# Patient Record
Sex: FEMALE | Race: WHITE | NOT HISPANIC OR LATINO | Employment: FULL TIME | URBAN - METROPOLITAN AREA
[De-identification: names, ages, dates, MRNs, and addresses within clinical notes are randomized per-mention and may not be internally consistent; named-entity substitution may affect disease eponyms.]

---

## 2017-01-26 ENCOUNTER — GENERIC CONVERSION - ENCOUNTER (OUTPATIENT)
Dept: OTHER | Facility: OTHER | Age: 22
End: 2017-01-26

## 2017-04-13 ENCOUNTER — GENERIC CONVERSION - ENCOUNTER (OUTPATIENT)
Dept: OTHER | Facility: OTHER | Age: 22
End: 2017-04-13

## 2017-07-06 ENCOUNTER — GENERIC CONVERSION - ENCOUNTER (OUTPATIENT)
Dept: OTHER | Facility: OTHER | Age: 22
End: 2017-07-06

## 2017-09-21 ENCOUNTER — GENERIC CONVERSION - ENCOUNTER (OUTPATIENT)
Dept: OTHER | Facility: OTHER | Age: 22
End: 2017-09-21

## 2017-12-13 ENCOUNTER — ALLSCRIPTS OFFICE VISIT (OUTPATIENT)
Dept: OTHER | Facility: OTHER | Age: 22
End: 2017-12-13

## 2017-12-18 LAB — PAP (HISTORICAL): NORMAL

## 2018-01-22 VITALS
SYSTOLIC BLOOD PRESSURE: 112 MMHG | BODY MASS INDEX: 38.65 KG/M2 | DIASTOLIC BLOOD PRESSURE: 64 MMHG | HEIGHT: 65 IN | WEIGHT: 232 LBS

## 2018-01-22 VITALS
SYSTOLIC BLOOD PRESSURE: 110 MMHG | DIASTOLIC BLOOD PRESSURE: 62 MMHG | WEIGHT: 233 LBS | BODY MASS INDEX: 38.82 KG/M2 | HEIGHT: 65 IN

## 2018-01-22 VITALS
BODY MASS INDEX: 38.15 KG/M2 | DIASTOLIC BLOOD PRESSURE: 70 MMHG | HEIGHT: 65 IN | WEIGHT: 229 LBS | SYSTOLIC BLOOD PRESSURE: 110 MMHG

## 2018-01-22 VITALS
WEIGHT: 235 LBS | DIASTOLIC BLOOD PRESSURE: 78 MMHG | BODY MASS INDEX: 39.15 KG/M2 | HEIGHT: 65 IN | SYSTOLIC BLOOD PRESSURE: 110 MMHG

## 2018-01-22 VITALS
HEIGHT: 65 IN | DIASTOLIC BLOOD PRESSURE: 78 MMHG | WEIGHT: 230 LBS | BODY MASS INDEX: 38.32 KG/M2 | SYSTOLIC BLOOD PRESSURE: 110 MMHG

## 2018-02-15 ENCOUNTER — TELEPHONE (OUTPATIENT)
Dept: OBGYN CLINIC | Facility: CLINIC | Age: 23
End: 2018-02-15

## 2018-02-16 ENCOUNTER — TELEPHONE (OUTPATIENT)
Dept: OBGYN CLINIC | Facility: CLINIC | Age: 23
End: 2018-02-16

## 2018-03-23 ENCOUNTER — OFFICE VISIT (OUTPATIENT)
Dept: OBGYN CLINIC | Facility: CLINIC | Age: 23
End: 2018-03-23
Payer: COMMERCIAL

## 2018-03-23 VITALS
HEIGHT: 65 IN | BODY MASS INDEX: 38.32 KG/M2 | DIASTOLIC BLOOD PRESSURE: 74 MMHG | WEIGHT: 230 LBS | SYSTOLIC BLOOD PRESSURE: 116 MMHG

## 2018-03-23 DIAGNOSIS — Z30.013 ENCOUNTER FOR INITIAL PRESCRIPTION OF INJECTABLE CONTRACEPTIVE: ICD-10-CM

## 2018-03-23 DIAGNOSIS — Z30.017 NEXPLANON INSERTION: Primary | ICD-10-CM

## 2018-03-23 PROBLEM — D68.59 ANTITHROMBIN 3 DEFICIENCY (HCC): Status: ACTIVE | Noted: 2017-12-13

## 2018-03-23 LAB — SL AMB POCT URINE HCG: NEGATIVE

## 2018-03-23 PROCEDURE — 11981 INSERTION DRUG DLVR IMPLANT: CPT | Performed by: PHYSICIAN ASSISTANT

## 2018-03-23 PROCEDURE — 81025 URINE PREGNANCY TEST: CPT | Performed by: PHYSICIAN ASSISTANT

## 2018-03-23 RX ORDER — SUMATRIPTAN 25 MG/1
TABLET, FILM COATED ORAL
COMMUNITY
Start: 2018-03-09

## 2018-03-23 RX ORDER — WARFARIN SODIUM 5 MG/1
TABLET ORAL
COMMUNITY
Start: 2018-01-27

## 2018-03-23 NOTE — PROGRESS NOTES
Reviewed and counseled on nexplanon, reviewed risks, benefits, procedure and device  Patient reports understanding and consent and desire to proceed with no further questions

## 2018-03-23 NOTE — PROGRESS NOTES
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  Has Antithrombin 3 deficiency  The following portions of the patient's history were reviewed and updated as appropriate:   She  has a past medical history of Bilateral pulmonary embolism ([facility] [address] ); DVT (deep [facility] thrombosis) ([facility] ) (2010, 2014); and Obesity  She   Patient Active Problem List    Diagnosis Date Noted    Encounter for initial prescription of injectable contraceptive 03/23/2018    Nexplanon insertion 03/23/2018    Antithrombin 3 deficiency ([facility] ) 12/13/2017    Abnormal menses 88/14/0620    Thromboembolic disorder ([facility] ) 62/03/6486     She  has a past surgical history that includes Ankle surgery and Tooth extraction  Her family history includes Clotting disorder in her other and sister; Diabetes type II in her father and paternal grandmother; Hypertension in her mother; Other in her father; Ovarian cancer in her paternal aunt; Protein S deficiency in her other  She  reports that she has never smoked  She has never used smokeless tobacco  She reports that she drinks alcohol  She reports that she does not use drugs  Current Outpatient Prescriptions   Medication Sig Dispense Refill    JANTOVEN 5 MG tablet       sertraline (ZOLOFT) 50 mg tablet       SUMAtriptan (IMITREX) 25 mg tablet        No current facility-administered medications for this visit  She is allergic to amoxicillin and clindamycin       Review of Systems   Constitutional: Negative for fatigue, fever and unexpected weight change     HENT: Negative for dental problem and sinus pressure  Eyes: Negative for visual disturbance  Respiratory: Negative for cough, shortness of breath and wheezing  Cardiovascular: Negative for chest pain  Gastrointestinal: Negative for abdominal pain, blood in stool, constipation, diarrhea, nausea and vomiting  Endocrine: Negative for polydipsia  Genitourinary: Negative for difficulty urinating, dyspareunia, dysuria, frequency, hematuria, pelvic pain and urgency  Musculoskeletal: Negative for arthralgias and back pain  Neurological: Negative for dizziness, seizures, light-headedness and headaches  Psychiatric/Behavioral: Negative for suicidal ideas  The patient is not nervous/anxious  Objective:      /74 (BP Location: Left arm, Patient Position: Sitting, Cuff Size: Large)   Ht 5' 5" (1 651 m)   Wt 104 kg (230 lb)   BMI 38 27 kg/m²            Physical Exam   Constitutional: She is oriented to person, place, and time  She appears well-developed and well-nourished  Neck: No thyromegaly present  Cardiovascular: Normal rate, regular rhythm and normal heart sounds  Exam reveals no gallop and no friction rub  No murmur heard  Pulmonary/Chest: Effort normal and breath sounds normal  No respiratory distress  She has no wheezes  She has no rales  She exhibits no tenderness  Neurological: She is alert and oriented to person, place, and time  Skin: Skin is warm and dry  Psychiatric: She has a normal mood and affect   Her behavior is normal        Remove and insert drug implant  Date/Time: 3/23/2018 11:41 AM  Performed by: Francesca Guillermo  Authorized by: Francesca Guillermo     Consent:     Consent obtained:  Verbal and written    Consent given by:  Patient    Procedural risks discussed:  Bleeding, infection, repeat procedure, possible continued pain and possible loss of function    Patient questions answered: yes      Patient agrees, verbalizes understanding, and wants to proceed: yes      Educational handouts given: yes    Universal Protocol:     Patient states understanding of procedure being performed: yes      Relevant documents present and verified: yes    Indication:     Indication: Insertion of non-biodegradable drug delivery implant    Pre-procedure:     Prepped with: povidone-iodine      Local anesthetic:  Lidocaine 1% (2ml)    The site was cleaned and prepped in a sterile fashion: yes    Procedure:     Procedure:   Insertion    Left/right:  Left    Preloaded Implanon trocar was placed subdermally: yes      Visualization of implant was obtained: yes      Implanon was inserted and trocar removed: yes      Visualization of notch in stilette and palpitation of device: yes      Palpitation confirms placement by provider and patient: yes      Site was closed with steri-strips and pressure bandage applied: yes

## 2023-03-23 NOTE — PROGRESS NOTES
Assessment/Plan   Problem List Items Addressed This Visit    None  Visit Diagnoses     Well woman exam    -  Primary    Relevant Orders    Liquid-based pap, screening    Screening examination for STD (sexually transmitted disease)        Relevant Orders    Chlamydia/GC amplified DNA by PCR    Trichomonas vaginalis Thin prep          Discussion      All questions have been answered to her satisfaction  RTO for APE or sooner if needed    Subjective     HPI   Maya Rosas is a 28 y o  female who presents for annual well woman exam      Pt had Nexplanon inserted 2021  No periods on Nexplaon at all  No vulvar itch/burn; No vaginal itch/burn; No abn discharge or odor; No urinary sx - burning/pain/frequency/hematuria    (+) SBEs - no breast masses, asymmetry, nipple discharge or bleeding, changes in skin of breast, or breast tenderness bilaterally    No abd/pelvic pain or HAs;     Pt is sexually active in a mutually monog sexual relationship x 5 years; No issues with intercourse; She desires sti testing; Feels safe at home    Current contraception: Nexplanon     (+) PCP for routine Bw/care; Last Pap - 17 WNL  History of abnormal Pap smear: denies       Review of Systems   Constitutional: Negative  Respiratory: Negative  Gastrointestinal: Negative  Endocrine: Negative  Genitourinary: Negative          The following portions of the patient's history were reviewed and updated as appropriate: allergies, current medications, past family history, past medical history, past social history, past surgical history and problem list          OB History        0    Para   0    Term   0       0    AB   0    Living   0       SAB   0    IAB   0    Ectopic   0    Multiple   0    Live Births   0           Obstetric Comments   MENARCHE AGE 14                 Past Medical History:   Diagnosis Date   • Bilateral pulmonary embolism ([facility] [address] )     x 2 2014   • [facility] 2010, 2014   • Migraine    • Obesity        Past Surgical History:   Procedure Laterality Date   • ANKLE SURGERY     • TOOTH EXTRACTION         Family History   Problem Relation Age of Onset   • Hypertension Mother    • Other Father         THROMBOEMBOLIC DISEASE   • Diabetes type II Father    • Diabetes Father    • Clotting disorder Sister    • Diabetes type II Paternal Grandmother    • Diabetes Paternal Grandmother    • Heart disease Paternal Grandmother    • Protein S deficiency Other    • Clotting disorder Other    • Ovarian cancer Paternal Aunt    • Heart attack Maternal Grandfather    • Cancer Maternal Grandmother         Lung cancer metastatic       Social History     Socioeconomic History   • Marital status: Single     Spouse name: Not on file   • Number of children: Not on file   • Years of education: Not on file   • Highest education level: Not on file   Occupational History   • Not on file   Tobacco Use   • Smoking status: Never   • Smokeless tobacco: Never   Vaping Use   • Vaping Use: Never used   Substance and Sexual Activity   • Alcohol use: Yes     Comment: social   • Drug use: No   • Sexual activity: Yes     Partners: Male     Birth control/protection: Implant     Comment: nexplanon   Other Topics Concern   • Not on file   Social History Narrative   • Not on file     Social Determinants of Health     Financial Resource Strain: Not on file   Food Insecurity: Not on file   Transportation Needs: Not on file   Physical Activity: Not on file   Stress: Not on file   Social Connections: Not on file   Intimate Partner Violence: Not on file   Housing Stability: Not on file         Current Outpatient Medications:   •  JANTOVEN 5 MG tablet, , Disp: , Rfl:   •  propranolol (INDERAL) 40 mg tablet, , Disp: , Rfl:   •  sertraline (ZOLOFT) 100 mg tablet, , Disp: , Rfl:     Allergies   Allergen Reactions   • Amoxicillin    • Clindamycin        Objective   Vitals:    03/27/23 1232   BP: 124/84   BP Location: Left arm Patient Position: Sitting   Cuff Size: Large   Weight: 117 kg (258 lb)     Physical Exam  Vitals reviewed  HENT:      Head: Normocephalic and atraumatic  Cardiovascular:      Rate and Rhythm: Normal rate and regular rhythm  Pulmonary:      Effort: Pulmonary effort is normal       Breath sounds: Normal breath sounds  Chest:   Breasts:     Breasts are symmetrical       Right: No swelling, bleeding, inverted nipple, mass, nipple discharge, skin change or tenderness  Left: No swelling, bleeding, inverted nipple, mass, nipple discharge, skin change or tenderness  Abdominal:      General: Abdomen is flat  Bowel sounds are normal       Palpations: Abdomen is soft  Tenderness: There is no abdominal tenderness  There is no right CVA tenderness, left CVA tenderness or guarding  Genitourinary:     General: Normal vulva  Pubic Area: No rash  Labia:         Right: No rash, tenderness, lesion or injury  Left: No rash, tenderness, lesion or injury  Urethra: No prolapse, urethral pain, urethral swelling or urethral lesion  Vagina: Normal  No signs of injury and foreign body  No vaginal discharge or erythema  Cervix: Normal       Uterus: Normal        Adnexa: Right adnexa normal and left adnexa normal    Musculoskeletal:      Cervical back: Neck supple  Lymphadenopathy:      Upper Body:      Right upper body: No axillary adenopathy  Left upper body: No axillary adenopathy  Skin:     General: Skin is warm and dry  Neurological:      Mental Status: She is alert and oriented to person, place, and time  Psychiatric:         Mood and Affect: Mood normal          Behavior: Behavior normal          Thought Content: Thought content normal          Judgment: Judgment normal          There are no Patient Instructions on file for this visit

## 2023-03-27 ENCOUNTER — ANNUAL EXAM (OUTPATIENT)
Dept: OBGYN CLINIC | Facility: CLINIC | Age: 28
End: 2023-03-27

## 2023-03-27 VITALS — WEIGHT: 258 LBS | BODY MASS INDEX: 42.93 KG/M2 | SYSTOLIC BLOOD PRESSURE: 124 MMHG | DIASTOLIC BLOOD PRESSURE: 84 MMHG

## 2023-03-27 DIAGNOSIS — Z01.419 WELL WOMAN EXAM: Primary | ICD-10-CM

## 2023-03-27 DIAGNOSIS — Z11.3 SCREENING EXAMINATION FOR STD (SEXUALLY TRANSMITTED DISEASE): ICD-10-CM

## 2023-03-27 RX ORDER — PYRAZINAMIDE 500 MG/1
1 TABLET ORAL EVERY 8 HOURS PRN
COMMUNITY
Start: 2023-02-24 | End: 2023-03-27

## 2023-03-27 RX ORDER — SERTRALINE HYDROCHLORIDE 100 MG/1
TABLET, FILM COATED ORAL
COMMUNITY
Start: 2019-03-26

## 2023-03-27 RX ORDER — PROPRANOLOL HYDROCHLORIDE 40 MG/1
TABLET ORAL
COMMUNITY
Start: 2023-03-17

## 2023-03-27 RX ORDER — HYDROXYZINE PAMOATE 25 MG/1
CAPSULE ORAL
COMMUNITY
Start: 2023-02-09 | End: 2023-03-27

## 2023-03-28 LAB
C TRACH DNA SPEC QL NAA+PROBE: NEGATIVE
N GONORRHOEA DNA SPEC QL NAA+PROBE: NEGATIVE

## 2023-04-03 LAB
LAB AP GYN PRIMARY INTERPRETATION: NORMAL
Lab: NORMAL